# Patient Record
Sex: MALE | Race: OTHER
[De-identification: names, ages, dates, MRNs, and addresses within clinical notes are randomized per-mention and may not be internally consistent; named-entity substitution may affect disease eponyms.]

---

## 2018-02-04 ENCOUNTER — HOSPITAL ENCOUNTER (EMERGENCY)
Dept: HOSPITAL 12 - ER | Age: 83
Discharge: HOME | End: 2018-02-04
Payer: MEDICARE

## 2018-02-04 VITALS — SYSTOLIC BLOOD PRESSURE: 124 MMHG | DIASTOLIC BLOOD PRESSURE: 67 MMHG

## 2018-02-04 VITALS — HEIGHT: 65 IN | WEIGHT: 155 LBS | BODY MASS INDEX: 25.83 KG/M2

## 2018-02-04 DIAGNOSIS — E03.9: ICD-10-CM

## 2018-02-04 DIAGNOSIS — I70.0: ICD-10-CM

## 2018-02-04 DIAGNOSIS — M10.9: Primary | ICD-10-CM

## 2018-02-04 DIAGNOSIS — J06.9: ICD-10-CM

## 2018-02-04 DIAGNOSIS — M19.90: ICD-10-CM

## 2018-02-04 LAB
ALP SERPL-CCNC: 75 U/L (ref 50–136)
ALT SERPL W/O P-5'-P-CCNC: 23 U/L (ref 16–63)
AST SERPL-CCNC: 22 U/L (ref 15–37)
BASOPHILS # BLD AUTO: 0 K/UL (ref 0–8)
BASOPHILS NFR BLD AUTO: 0.6 % (ref 0–2)
BILIRUB DIRECT SERPL-MCNC: 0.1 MG/DL (ref 0–0.2)
BILIRUB SERPL-MCNC: 0.5 MG/DL (ref 0.2–1)
BUN SERPL-MCNC: 25 MG/DL (ref 7–18)
CHLORIDE SERPL-SCNC: 104 MMOL/L (ref 98–107)
CO2 SERPL-SCNC: 22 MMOL/L (ref 21–32)
CREAT SERPL-MCNC: 2.1 MG/DL (ref 0.6–1.3)
EOSINOPHIL # BLD AUTO: 0.2 K/UL (ref 0–0.7)
EOSINOPHIL NFR BLD AUTO: 3.4 % (ref 0–7)
GLUCOSE SERPL-MCNC: 115 MG/DL (ref 74–106)
HCT VFR BLD AUTO: 34.2 % (ref 36.7–47.1)
HGB BLD-MCNC: 11.4 G/DL (ref 12.5–16.3)
LYMPHOCYTES # BLD AUTO: 1.5 K/UL (ref 20–40)
LYMPHOCYTES NFR BLD AUTO: 20.6 % (ref 20.5–51.5)
MCH RBC QN AUTO: 32.7 UUG (ref 23.8–33.4)
MCHC RBC AUTO-ENTMCNC: 34 G/DL (ref 32.5–36.3)
MCV RBC AUTO: 97.5 FL (ref 73–96.2)
MONOCYTES # BLD AUTO: 0.6 K/UL (ref 2–10)
MONOCYTES NFR BLD AUTO: 8.6 % (ref 0–11)
NEUTROPHILS # BLD AUTO: 4.8 K/UL (ref 1.8–8.9)
NEUTROPHILS NFR BLD AUTO: 66.8 % (ref 38.5–71.5)
PLATELET # BLD AUTO: 166 K/UL (ref 152–348)
POTASSIUM SERPL-SCNC: 4.2 MMOL/L (ref 3.5–5.1)
RBC # BLD AUTO: 3.5 MIL/UL (ref 4.06–5.63)
WBC # BLD AUTO: 7.2 K/UL (ref 3.6–10.2)
WS STN SPEC: 7.6 G/DL (ref 6.4–8.2)

## 2018-02-04 PROCEDURE — A4663 DIALYSIS BLOOD PRESSURE CUFF: HCPCS

## 2023-06-06 ENCOUNTER — HOSPITAL ENCOUNTER (INPATIENT)
Dept: HOSPITAL 12 - ER | Age: 88
LOS: 1 days | Discharge: HOME | DRG: 683 | End: 2023-06-07
Attending: INTERNAL MEDICINE | Admitting: INTERNAL MEDICINE
Payer: COMMERCIAL

## 2023-06-06 VITALS — WEIGHT: 145 LBS | BODY MASS INDEX: 24.16 KG/M2 | HEIGHT: 65 IN

## 2023-06-06 VITALS — DIASTOLIC BLOOD PRESSURE: 57 MMHG | SYSTOLIC BLOOD PRESSURE: 127 MMHG

## 2023-06-06 VITALS — SYSTOLIC BLOOD PRESSURE: 146 MMHG | DIASTOLIC BLOOD PRESSURE: 76 MMHG

## 2023-06-06 VITALS — SYSTOLIC BLOOD PRESSURE: 138 MMHG | DIASTOLIC BLOOD PRESSURE: 53 MMHG

## 2023-06-06 DIAGNOSIS — G45.9: ICD-10-CM

## 2023-06-06 DIAGNOSIS — J30.2: ICD-10-CM

## 2023-06-06 DIAGNOSIS — N18.9: ICD-10-CM

## 2023-06-06 DIAGNOSIS — I10: ICD-10-CM

## 2023-06-06 DIAGNOSIS — D53.9: ICD-10-CM

## 2023-06-06 DIAGNOSIS — N25.0: ICD-10-CM

## 2023-06-06 DIAGNOSIS — H91.93: ICD-10-CM

## 2023-06-06 DIAGNOSIS — N17.0: Primary | ICD-10-CM

## 2023-06-06 DIAGNOSIS — E78.5: ICD-10-CM

## 2023-06-06 DIAGNOSIS — Z96.21: ICD-10-CM

## 2023-06-06 DIAGNOSIS — G93.89: ICD-10-CM

## 2023-06-06 DIAGNOSIS — I69.398: ICD-10-CM

## 2023-06-06 DIAGNOSIS — Z79.890: ICD-10-CM

## 2023-06-06 DIAGNOSIS — H53.121: ICD-10-CM

## 2023-06-06 DIAGNOSIS — I35.1: ICD-10-CM

## 2023-06-06 DIAGNOSIS — I13.10: ICD-10-CM

## 2023-06-06 DIAGNOSIS — Z20.822: ICD-10-CM

## 2023-06-06 DIAGNOSIS — E03.9: ICD-10-CM

## 2023-06-06 DIAGNOSIS — Z79.899: ICD-10-CM

## 2023-06-06 DIAGNOSIS — I35.8: ICD-10-CM

## 2023-06-06 LAB
ALP SERPL-CCNC: 131 U/L (ref 50–136)
ALT SERPL W/O P-5'-P-CCNC: 24 U/L (ref 16–63)
APPEARANCE UR: CLEAR
AST SERPL-CCNC: 14 U/L (ref 15–37)
BILIRUB DIRECT SERPL-MCNC: 0.1 MG/DL (ref 0–0.2)
BILIRUB SERPL-MCNC: 0.3 MG/DL (ref 0.2–1)
BILIRUB UR QL STRIP: NEGATIVE
BUN SERPL-MCNC: 43 MG/DL (ref 7–18)
CHLORIDE SERPL-SCNC: 106 MMOL/L (ref 98–107)
CO2 SERPL-SCNC: 23 MMOL/L (ref 21–32)
COLOR UR: YELLOW
CREAT SERPL-MCNC: 2.5 MG/DL (ref 0.6–1.3)
GLUCOSE SERPL-MCNC: 76 MG/DL (ref 74–106)
GLUCOSE UR STRIP-MCNC: NEGATIVE MG/DL
HCT VFR BLD AUTO: 30.6 % (ref 36.7–47.1)
HGB UR QL STRIP: NEGATIVE
KETONES UR STRIP-MCNC: NEGATIVE MG/DL
LEUKOCYTE ESTERASE UR QL STRIP: NEGATIVE
MCH RBC QN AUTO: 33.3 UUG (ref 23.8–33.4)
MCV RBC AUTO: 98.9 FL (ref 73–96.2)
NITRITE UR QL STRIP: NEGATIVE
PH UR STRIP: 5 [PH] (ref 5–8)
PLATELET # BLD AUTO: 201 K/UL (ref 152–348)
POTASSIUM SERPL-SCNC: 4.9 MMOL/L (ref 3.5–5.1)
RBC #/AREA URNS HPF: (no result) /HPF (ref 0–3)
SP GR UR STRIP: 1.02 (ref 1–1.03)
UROBILINOGEN UR STRIP-MCNC: 0.2 E.U./DL
WBC #/AREA URNS HPF: (no result) /HPF
WBC #/AREA URNS HPF: (no result) /HPF (ref 0–3)
WS STN SPEC: 7.3 G/DL (ref 6.4–8.2)

## 2023-06-06 PROCEDURE — G0378 HOSPITAL OBSERVATION PER HR: HCPCS

## 2023-06-06 PROCEDURE — A6209 FOAM DRSG <=16 SQ IN W/O BDR: HCPCS

## 2023-06-06 PROCEDURE — A4663 DIALYSIS BLOOD PRESSURE CUFF: HCPCS

## 2023-06-06 RX ADMIN — SODIUM CHLORIDE PRN MLS/HR: 0.45 INJECTION, SOLUTION INTRAVENOUS at 16:34

## 2023-06-06 NOTE — NUR
Received patient lying in bed AAOx4. In no apparent distress. IV intact and patent. Will 
continue plan of care

## 2023-06-06 NOTE — NUR
Received Pt's daughter's (Shea) phone number: [phone]. Safety 
measures in place. Will continue to monitor.

## 2023-06-06 NOTE — NUR
Pt has been admitted to  305 in stable condition. Pt arrived in  safely. 
All belongings are with Pt. Sissy is aware of Pt's arrival.

## 2023-06-06 NOTE — NUR
Received call from 3rd floor Charge Willis BOUDREAUX: pt will be going to Rm 305. 
Accepting Nurse is Sissy. Will call Sissy in approximately 15-20 min for 
report.

## 2023-06-06 NOTE — NUR
Pt seen by MD for bedside eval. UA sent to lab. Blood work sent to lab. Safety 
measures in place. Will continue to monitor.

## 2023-06-06 NOTE — NUR
87 YEAR OLD MALE RECEIVED TO ROOM 305  FOR HYPERTENSION AND RENAL INSUFFICIENCY.PT DENIES 
ANY PAIN SOB.CALL LIGHT WITH IN REACH ORIENT THE PT TO ROOM MD NOTIFIED FOR THE ADMISSION

## 2023-06-07 VITALS — SYSTOLIC BLOOD PRESSURE: 137 MMHG | DIASTOLIC BLOOD PRESSURE: 58 MMHG

## 2023-06-07 VITALS — SYSTOLIC BLOOD PRESSURE: 125 MMHG | DIASTOLIC BLOOD PRESSURE: 52 MMHG

## 2023-06-07 VITALS — DIASTOLIC BLOOD PRESSURE: 75 MMHG | SYSTOLIC BLOOD PRESSURE: 124 MMHG

## 2023-06-07 VITALS — DIASTOLIC BLOOD PRESSURE: 58 MMHG | SYSTOLIC BLOOD PRESSURE: 126 MMHG

## 2023-06-07 LAB
ALP SERPL-CCNC: 119 U/L (ref 50–136)
ALT SERPL W/O P-5'-P-CCNC: 25 U/L (ref 16–63)
AST SERPL-CCNC: 23 U/L (ref 15–37)
BILIRUB SERPL-MCNC: 0.3 MG/DL (ref 0.2–1)
BUN SERPL-MCNC: 40 MG/DL (ref 7–18)
CHLORIDE SERPL-SCNC: 104 MMOL/L (ref 98–107)
CHOLEST SERPL-MCNC: 169 MG/DL (ref ?–200)
CO2 SERPL-SCNC: 24 MMOL/L (ref 21–32)
CREAT SERPL-MCNC: 2.3 MG/DL (ref 0.6–1.3)
GLUCOSE SERPL-MCNC: 90 MG/DL (ref 74–106)
HCT VFR BLD AUTO: 28.2 % (ref 36.7–47.1)
HDLC SERPL-MCNC: 48 MG/DL (ref 40–60)
IRON SERPL-MCNC: 75 UG/DL (ref 50–175)
MAGNESIUM SERPL-MCNC: 2.1 MG/DL (ref 1.8–2.4)
MCH RBC QN AUTO: 33.8 UUG (ref 23.8–33.4)
MCV RBC AUTO: 98.9 FL (ref 73–96.2)
PHOSPHATE SERPL-MCNC: 3.8 MG/DL (ref 2.5–4.9)
PLATELET # BLD AUTO: 181 K/UL (ref 152–348)
POTASSIUM SERPL-SCNC: 4.9 MMOL/L (ref 3.5–5.1)
TRIGL SERPL-MCNC: 158 MG/DL (ref 30–150)
TSH SERPL DL<=0.005 MIU/L-ACNC: 7.08 MIU/ML (ref 0.36–3.74)
WS STN SPEC: 6.9 G/DL (ref 6.4–8.2)

## 2023-06-07 RX ADMIN — SODIUM CHLORIDE PRN MLS/HR: 0.45 INJECTION, SOLUTION INTRAVENOUS at 10:57

## 2023-06-07 NOTE — NUR
pt is discharge. pt will go home with family. pt alert and oriented. family at bedside. 
ambulatory with steady gait. denies dizziness or pain. vitals wnl. exit care provided. iv 
access removed. educated family regarding new medication of the pt.  wheelchair was offered 
but pt refused family aware. pt will walk going to the car.  pt daughter erwin will drive 
pt home.

## 2023-06-07 NOTE — NUR
Patient expressed concerns about reasoning of hospitalization. Had difficulty sleeping. 
Needs attended to and met. Continue plan of care

## 2023-07-09 ENCOUNTER — HOSPITAL ENCOUNTER (INPATIENT)
Dept: HOSPITAL 12 - ER | Age: 88
LOS: 4 days | Discharge: HOME | DRG: 683 | End: 2023-07-13
Payer: COMMERCIAL

## 2023-07-09 VITALS — WEIGHT: 142.02 LBS | HEIGHT: 63 IN | BODY MASS INDEX: 25.16 KG/M2

## 2023-07-09 VITALS — TEMPERATURE: 98.2 F | OXYGEN SATURATION: 98 % | SYSTOLIC BLOOD PRESSURE: 107 MMHG | DIASTOLIC BLOOD PRESSURE: 50 MMHG

## 2023-07-09 DIAGNOSIS — E03.9: ICD-10-CM

## 2023-07-09 DIAGNOSIS — I12.9: ICD-10-CM

## 2023-07-09 DIAGNOSIS — N18.4: ICD-10-CM

## 2023-07-09 DIAGNOSIS — H91.90: ICD-10-CM

## 2023-07-09 DIAGNOSIS — D63.1: ICD-10-CM

## 2023-07-09 DIAGNOSIS — I95.9: ICD-10-CM

## 2023-07-09 DIAGNOSIS — E44.1: ICD-10-CM

## 2023-07-09 DIAGNOSIS — N17.0: Primary | ICD-10-CM

## 2023-07-09 DIAGNOSIS — Z79.82: ICD-10-CM

## 2023-07-09 DIAGNOSIS — E87.5: ICD-10-CM

## 2023-07-09 DIAGNOSIS — E88.09: ICD-10-CM

## 2023-07-09 DIAGNOSIS — M19.90: ICD-10-CM

## 2023-07-09 DIAGNOSIS — D53.9: ICD-10-CM

## 2023-07-09 LAB
ALP SERPL-CCNC: 111 U/L (ref 50–136)
ALT SERPL W/O P-5'-P-CCNC: 20 U/L (ref 16–63)
APPEARANCE UR: CLEAR
AST SERPL-CCNC: 11 U/L (ref 15–37)
BILIRUB DIRECT SERPL-MCNC: 0.1 MG/DL (ref 0–0.2)
BILIRUB SERPL-MCNC: 0.4 MG/DL (ref 0.2–1)
BILIRUB UR QL STRIP: NEGATIVE
BUN SERPL-MCNC: 65 MG/DL (ref 7–18)
CHLORIDE SERPL-SCNC: 103 MMOL/L (ref 98–107)
CO2 SERPL-SCNC: 21 MMOL/L (ref 21–32)
COLOR UR: YELLOW
CREAT SERPL-MCNC: 3.3 MG/DL (ref 0.6–1.3)
DEPRECATED SQUAMOUS URNS QL MICRO: (no result) /HPF
GLUCOSE UR STRIP-MCNC: NEGATIVE MG/DL
HCT VFR BLD AUTO: 28 % (ref 36.7–47.1)
HGB UR QL STRIP: (no result)
KETONES UR STRIP-MCNC: NEGATIVE MG/DL
LEUKOCYTE ESTERASE UR QL STRIP: NEGATIVE
MCH RBC QN AUTO: 33.9 UUG (ref 23.8–33.4)
MCV RBC AUTO: 100 FL (ref 73–96.2)
NITRITE UR QL STRIP: NEGATIVE
PH UR STRIP: 5.5 [PH] (ref 5–8)
PLATELET # BLD AUTO: 200 K/UL (ref 152–348)
POTASSIUM SERPL-SCNC: 5.2 MMOL/L (ref 3.5–5.1)
RBC #/AREA URNS HPF: (no result) /HPF (ref 0–3)
SP GR UR STRIP: 1.01 (ref 1–1.03)
UROBILINOGEN UR STRIP-MCNC: 0.2 E.U./DL
WBC #/AREA URNS HPF: (no result) /HPF
WBC #/AREA URNS HPF: (no result) /HPF (ref 0–3)
WS STN SPEC: 7.6 G/DL (ref 6.4–8.2)

## 2023-07-09 PROCEDURE — A4663 DIALYSIS BLOOD PRESSURE CUFF: HCPCS

## 2023-07-09 PROCEDURE — G0378 HOSPITAL OBSERVATION PER HR: HCPCS

## 2023-07-09 PROCEDURE — C9113 INJ PANTOPRAZOLE SODIUM, VIA: HCPCS

## 2023-07-09 RX ADMIN — SODIUM CHLORIDE PRN MLS/HR: 0.9 INJECTION, SOLUTION INTRAVENOUS at 22:26

## 2023-07-09 RX ADMIN — DOCUSATE SODIUM SCH MG: 100 CAPSULE, LIQUID FILLED ORAL at 22:14

## 2023-07-09 RX ADMIN — HEPARIN SODIUM SCH UNITS: 5000 INJECTION, SOLUTION INTRAVENOUS; SUBCUTANEOUS at 22:16

## 2023-07-09 NOTE — NUR
Pt care continue as report is given to the 3RD FLOOR Nurse Tita as pt is been 
admitted to 3RD Floor Room 307 under the care off DR. Pinto.

## 2023-07-09 NOTE — NUR
The patient, SALBADOR MUÑIZ, 86 y/o, M admitted by ISABELA WILLIS NP, was given 
written information regarding hospital policies, unit procedures and contact persons.  



Valuables were checked and [].BIB BED A/A/O XS4, NO DISTRESS NOTED

## 2023-07-09 NOTE — NUR
Pt is noted in bed responsive as report is received from the off going nurse 
that Pt was brought in by family C/O hypotension with BP in the 88/54 and 
weakness x3days. Pt care contiune as he is been admitted to MED/SUR[facility] Room 307, 
report will be given shortly.

## 2023-07-10 VITALS — TEMPERATURE: 97.8 F | OXYGEN SATURATION: 100 % | DIASTOLIC BLOOD PRESSURE: 53 MMHG | SYSTOLIC BLOOD PRESSURE: 113 MMHG

## 2023-07-10 VITALS — TEMPERATURE: 97.9 F | DIASTOLIC BLOOD PRESSURE: 52 MMHG | SYSTOLIC BLOOD PRESSURE: 138 MMHG | OXYGEN SATURATION: 97 %

## 2023-07-10 VITALS — OXYGEN SATURATION: 98 % | TEMPERATURE: 97.7 F | SYSTOLIC BLOOD PRESSURE: 143 MMHG | DIASTOLIC BLOOD PRESSURE: 63 MMHG

## 2023-07-10 VITALS — SYSTOLIC BLOOD PRESSURE: 155 MMHG | DIASTOLIC BLOOD PRESSURE: 69 MMHG | TEMPERATURE: 98 F

## 2023-07-10 LAB
APPEARANCE UR: CLEAR
BILIRUB UR QL STRIP: NEGATIVE
BUN SERPL-MCNC: 58 MG/DL (ref 7–18)
CHLORIDE SERPL-SCNC: 109 MMOL/L (ref 98–107)
CO2 SERPL-SCNC: 21 MMOL/L (ref 21–32)
COLOR UR: YELLOW
CREAT SERPL-MCNC: 2.8 MG/DL (ref 0.6–1.3)
CREAT UR-MCNC: 46 MG/DL (ref 30–125)
GLUCOSE UR STRIP-MCNC: NEGATIVE MG/DL
HCT VFR BLD AUTO: 25.2 % (ref 36.7–47.1)
HGB UR QL STRIP: NEGATIVE
KETONES UR STRIP-MCNC: NEGATIVE MG/DL
LEUKOCYTE ESTERASE UR QL STRIP: NEGATIVE
MAGNESIUM SERPL-MCNC: 2 MG/DL (ref 1.8–2.4)
MCH RBC QN AUTO: 34.1 UUG (ref 23.8–33.4)
MCV RBC AUTO: 100 FL (ref 73–96.2)
NITRITE UR QL STRIP: NEGATIVE
PH UR STRIP: 5.5 [PH] (ref 5–8)
PHOSPHATE SERPL-MCNC: 5.3 MG/DL (ref 2.5–4.9)
PLATELET # BLD AUTO: 171 K/UL (ref 152–348)
POTASSIUM SERPL-SCNC: 5 MMOL/L (ref 3.5–5.1)
PROT UR-MCNC: 24.4 MG/DL
SP GR UR STRIP: 1.01 (ref 1–1.03)
UROBILINOGEN UR STRIP-MCNC: 0.2 E.U./DL

## 2023-07-10 RX ADMIN — HEPARIN SODIUM SCH UNITS: 5000 INJECTION, SOLUTION INTRAVENOUS; SUBCUTANEOUS at 08:25

## 2023-07-10 RX ADMIN — Medication SCH TAB: at 08:24

## 2023-07-10 RX ADMIN — HEPARIN SODIUM SCH UNITS: 5000 INJECTION, SOLUTION INTRAVENOUS; SUBCUTANEOUS at 20:59

## 2023-07-10 RX ADMIN — ASPIRIN SCH MG: 81 TABLET, COATED ORAL at 08:23

## 2023-07-10 RX ADMIN — ATORVASTATIN CALCIUM SCH MG: 40 TABLET, FILM COATED ORAL at 20:58

## 2023-07-10 RX ADMIN — DEXTROSE SCH MG: 50 INJECTION, SOLUTION INTRAVENOUS at 08:24

## 2023-07-10 RX ADMIN — DOCUSATE SODIUM SCH MG: 100 CAPSULE, LIQUID FILLED ORAL at 20:59

## 2023-07-10 RX ADMIN — SODIUM CHLORIDE PRN MLS/HR: 0.9 INJECTION, SOLUTION INTRAVENOUS at 12:10

## 2023-07-10 NOTE — NUR
Detail Level: Detailed PATIENT SEEN AND EXAMINED BY DR BATES WITH NEW ORDERS AND NOTED. Detail Level: Simple

## 2023-07-10 NOTE — NUR
PATIENT AND HER SON AND WIFE AT THE BEDSIDE AT THIS TIME HE IS AWARE OF NEED FOR URINE 
SPECIMEN STILL AWAITING FOR SPECIMEN PATIENT HAS GONE TO THE BATHROOM BY HIM SELF AND FORGOT 
TO COLLECT THE SPECIMEN WILL CONTINUE TO OBSERVE.

## 2023-07-10 NOTE — NUR
RECEIVED PATIENT AWAKE ALERT AND ABLE TO MAKE SIMPLE NEEDS KNOWN DENIES DISCOMFORTS REMAIN 
ON IVF AS ORDERED WITH NO S/S OF INFILTERATION ON SITE CALL LIGHTS AND PERSONAL BELONGINGS 
ARE WITHIN EASY REACH ASSISTED TO THE BATHROOM HOLDING ONTO THE IV POLE WITH SLOW STEADY 
GAIT WILL CONTINUE TO OBSERVE.

## 2023-07-11 VITALS — SYSTOLIC BLOOD PRESSURE: 127 MMHG | OXYGEN SATURATION: 99 % | DIASTOLIC BLOOD PRESSURE: 50 MMHG | TEMPERATURE: 98 F

## 2023-07-11 VITALS — OXYGEN SATURATION: 98 % | TEMPERATURE: 98.4 F | SYSTOLIC BLOOD PRESSURE: 149 MMHG | DIASTOLIC BLOOD PRESSURE: 64 MMHG

## 2023-07-11 VITALS — TEMPERATURE: 97.6 F | OXYGEN SATURATION: 98 % | DIASTOLIC BLOOD PRESSURE: 70 MMHG | SYSTOLIC BLOOD PRESSURE: 159 MMHG

## 2023-07-11 VITALS — OXYGEN SATURATION: 99 % | SYSTOLIC BLOOD PRESSURE: 125 MMHG | DIASTOLIC BLOOD PRESSURE: 86 MMHG | TEMPERATURE: 98.3 F

## 2023-07-11 LAB
ALP SERPL-CCNC: 90 U/L (ref 50–136)
ALT SERPL W/O P-5'-P-CCNC: 21 U/L (ref 16–63)
AST SERPL-CCNC: 16 U/L (ref 15–37)
BILIRUB SERPL-MCNC: 0.2 MG/DL (ref 0.2–1)
BUN SERPL-MCNC: 46 MG/DL (ref 7–18)
CHLORIDE SERPL-SCNC: 111 MMOL/L (ref 98–107)
CK SERPL-CCNC: 114 U/L (ref 39–308)
CO2 SERPL-SCNC: 22 MMOL/L (ref 21–32)
CREAT SERPL-MCNC: 2.3 MG/DL (ref 0.6–1.3)
HCT VFR BLD AUTO: 24.5 % (ref 36.7–47.1)
MAGNESIUM SERPL-MCNC: 1.8 MG/DL (ref 1.8–2.4)
MCH RBC QN AUTO: 33.8 UUG (ref 23.8–33.4)
MCV RBC AUTO: 99.7 FL (ref 73–96.2)
PHOSPHATE SERPL-MCNC: 4 MG/DL (ref 2.5–4.9)
PLATELET # BLD AUTO: 166 K/UL (ref 152–348)
POTASSIUM SERPL-SCNC: 4.9 MMOL/L (ref 3.5–5.1)
WS STN SPEC: 6.5 G/DL (ref 6.4–8.2)

## 2023-07-11 RX ADMIN — Medication SCH TAB: at 08:39

## 2023-07-11 RX ADMIN — HEPARIN SODIUM SCH UNITS: 5000 INJECTION, SOLUTION INTRAVENOUS; SUBCUTANEOUS at 20:50

## 2023-07-11 RX ADMIN — ASPIRIN SCH MG: 81 TABLET, COATED ORAL at 08:39

## 2023-07-11 RX ADMIN — SODIUM CHLORIDE PRN MLS/HR: 0.9 INJECTION, SOLUTION INTRAVENOUS at 01:36

## 2023-07-11 RX ADMIN — SODIUM CHLORIDE PRN MLS/HR: 0.9 INJECTION, SOLUTION INTRAVENOUS at 14:24

## 2023-07-11 RX ADMIN — ATORVASTATIN CALCIUM SCH MG: 40 TABLET, FILM COATED ORAL at 20:45

## 2023-07-11 RX ADMIN — DOCUSATE SODIUM SCH MG: 100 CAPSULE, LIQUID FILLED ORAL at 20:45

## 2023-07-11 RX ADMIN — DEXTROSE SCH MG: 50 INJECTION, SOLUTION INTRAVENOUS at 08:39

## 2023-07-11 RX ADMIN — LEVOTHYROXINE SODIUM SCH MCG: 100 TABLET ORAL at 06:23

## 2023-07-11 RX ADMIN — HEPARIN SODIUM SCH UNITS: 5000 INJECTION, SOLUTION INTRAVENOUS; SUBCUTANEOUS at 08:38

## 2023-07-11 NOTE — NUR
RESTING IN BED AWAKE ALERT AND COOPERATIVE HE IS FORGETFUL AT THIS TIME ABLE TO MAKE SIMPLE 
NEEDS KNOWN REMAIN ON IVF AS ORDERED WITH NO S/S OF INFILTERATION AT THIS TIME CALL LIGHTS 
AND PERSOANL BELONGINGS ARE WITHIN EASY REACH WILL CONTINUE TO OBSERVE.

## 2023-07-11 NOTE — NUR
Patient awake, speak Uz[place] and hard of hearing, but able to understand little English, 
staff speak U[place] able to communicated patient needs, no complain of pain at this time, 
assist with toileting, cont to monitor.

## 2023-07-11 NOTE — NUR
RESTING COMFORTABLY WITH IVF IN PROGRESS AS ORDERED DENIES DISCOMFORTS FAMILY AT BEDSIDE AT 
THIS TIME WILL CONTINUE TO OBSERVE.

## 2023-07-12 VITALS — SYSTOLIC BLOOD PRESSURE: 165 MMHG | OXYGEN SATURATION: 100 % | TEMPERATURE: 98.5 F | DIASTOLIC BLOOD PRESSURE: 67 MMHG

## 2023-07-12 VITALS — OXYGEN SATURATION: 100 % | DIASTOLIC BLOOD PRESSURE: 51 MMHG | SYSTOLIC BLOOD PRESSURE: 144 MMHG | TEMPERATURE: 97.8 F

## 2023-07-12 VITALS — TEMPERATURE: 98.7 F | OXYGEN SATURATION: 100 % | SYSTOLIC BLOOD PRESSURE: 151 MMHG | DIASTOLIC BLOOD PRESSURE: 66 MMHG

## 2023-07-12 VITALS — SYSTOLIC BLOOD PRESSURE: 124 MMHG | OXYGEN SATURATION: 98 % | TEMPERATURE: 97.8 F | DIASTOLIC BLOOD PRESSURE: 55 MMHG

## 2023-07-12 VITALS — OXYGEN SATURATION: 100 % | TEMPERATURE: 98.7 F | DIASTOLIC BLOOD PRESSURE: 68 MMHG | SYSTOLIC BLOOD PRESSURE: 148 MMHG

## 2023-07-12 VITALS — TEMPERATURE: 97.6 F | DIASTOLIC BLOOD PRESSURE: 61 MMHG | SYSTOLIC BLOOD PRESSURE: 160 MMHG | OXYGEN SATURATION: 100 %

## 2023-07-12 VITALS — SYSTOLIC BLOOD PRESSURE: 148 MMHG | TEMPERATURE: 97.6 F | OXYGEN SATURATION: 99 % | DIASTOLIC BLOOD PRESSURE: 50 MMHG

## 2023-07-12 LAB
BUN SERPL-MCNC: 40 MG/DL (ref 7–18)
CHLORIDE SERPL-SCNC: 111 MMOL/L (ref 98–107)
CO2 SERPL-SCNC: 22 MMOL/L (ref 21–32)
CREAT SERPL-MCNC: 2.2 MG/DL (ref 0.6–1.3)
FERRITIN SERPL-MCNC: 187 NG/ML (ref 26–388)
HCT VFR BLD AUTO: 23.5 % (ref 36.7–47.1)
HEMOCCULT STL QL: NEGATIVE
IRON SERPL-MCNC: 64 UG/DL (ref 50–175)
MAGNESIUM SERPL-MCNC: 1.9 MG/DL (ref 1.8–2.4)
MCH RBC QN AUTO: 34.2 UUG (ref 23.8–33.4)
MCV RBC AUTO: 100.4 FL (ref 73–96.2)
PHOSPHATE SERPL-MCNC: 3.7 MG/DL (ref 2.5–4.9)
PLATELET # BLD AUTO: 160 K/UL (ref 152–348)
POTASSIUM SERPL-SCNC: 5.1 MMOL/L (ref 3.5–5.1)

## 2023-07-12 RX ADMIN — DEXTROSE SCH MG: 50 INJECTION, SOLUTION INTRAVENOUS at 08:20

## 2023-07-12 RX ADMIN — ASPIRIN SCH MG: 81 TABLET, COATED ORAL at 08:20

## 2023-07-12 RX ADMIN — SODIUM CHLORIDE PRN MLS/HR: 0.9 INJECTION, SOLUTION INTRAVENOUS at 00:37

## 2023-07-12 RX ADMIN — HEPARIN SODIUM SCH UNITS: 5000 INJECTION, SOLUTION INTRAVENOUS; SUBCUTANEOUS at 20:53

## 2023-07-12 RX ADMIN — LEVOTHYROXINE SODIUM SCH MCG: 100 TABLET ORAL at 06:07

## 2023-07-12 RX ADMIN — HEPARIN SODIUM SCH UNITS: 5000 INJECTION, SOLUTION INTRAVENOUS; SUBCUTANEOUS at 08:24

## 2023-07-12 RX ADMIN — DOCUSATE SODIUM SCH MG: 100 CAPSULE, LIQUID FILLED ORAL at 20:51

## 2023-07-12 RX ADMIN — ATORVASTATIN CALCIUM SCH MG: 40 TABLET, FILM COATED ORAL at 20:51

## 2023-07-12 RX ADMIN — Medication SCH TAB: at 08:20

## 2023-07-12 RX ADMIN — LOSARTAN POTASSIUM SCH MG: 50 TABLET, FILM COATED ORAL at 17:50

## 2023-07-12 NOTE — NUR
Received patient awake and alert, standing on the bedside, ambulating. In no acute distress, 
no SOB

Seen and examined by Amberly Cramer NP with orders made and carried out

Vital signs taken and recorded

Due medications given

Stool specimen submitted to laboratory

Observed accordingly, needs attended

## 2023-07-12 NOTE — NUR
Received patient awake , ambulate to toilet with steady gait, no sob no chest pain, Patient 
alert oriented, no confusion noted, voiding well, no complain of pain, cont to monitor.

## 2023-07-12 NOTE — NUR
Patient awake, no complain of pain at this time, ambulate to toilet for bladder 
eliminations, cont to monitor.

## 2023-07-13 VITALS — TEMPERATURE: 98.3 F | OXYGEN SATURATION: 98 % | DIASTOLIC BLOOD PRESSURE: 61 MMHG | SYSTOLIC BLOOD PRESSURE: 148 MMHG

## 2023-07-13 VITALS — OXYGEN SATURATION: 100 % | SYSTOLIC BLOOD PRESSURE: 145 MMHG | TEMPERATURE: 98.1 F | DIASTOLIC BLOOD PRESSURE: 63 MMHG

## 2023-07-13 VITALS — OXYGEN SATURATION: 100 % | SYSTOLIC BLOOD PRESSURE: 164 MMHG | DIASTOLIC BLOOD PRESSURE: 70 MMHG | TEMPERATURE: 98.1 F

## 2023-07-13 VITALS — SYSTOLIC BLOOD PRESSURE: 143 MMHG | OXYGEN SATURATION: 98 % | TEMPERATURE: 97.8 F | DIASTOLIC BLOOD PRESSURE: 57 MMHG

## 2023-07-13 LAB
ALBUMIN SERPL ELPH-MCNC: 3.3 G/DL (ref 2.9–4.4)
ALBUMIN/GLOB SERPL: 1.3 {RATIO} (ref 0.7–1.7)
ALP SERPL-CCNC: 94 U/L (ref 50–136)
ALPHA1 GLOB SERPL ELPH-MCNC: 0.2 G/DL (ref 0–0.4)
ALPHA2 GLOB SERPL ELPH-MCNC: 0.6 G/DL (ref 0.4–1)
ALT SERPL W/O P-5'-P-CCNC: 23 U/L (ref 16–63)
AST SERPL-CCNC: 11 U/L (ref 15–37)
B-GLOBULIN SERPL ELPH-MCNC: 0.9 G/DL (ref 0.7–1.3)
BILIRUB SERPL-MCNC: 0.4 MG/DL (ref 0.2–1)
BUN SERPL-MCNC: 35 MG/DL (ref 7–18)
CHLORIDE SERPL-SCNC: 110 MMOL/L (ref 98–107)
CO2 SERPL-SCNC: 23 MMOL/L (ref 21–32)
CREAT SERPL-MCNC: 2.2 MG/DL (ref 0.6–1.3)
GAMMA GLOB SERPL ELPH-MCNC: 1 G/DL (ref 0.4–1.8)
GLOBULIN SER CALC-MCNC: 2.6 G/DL (ref 2.2–3.9)
HCT VFR BLD AUTO: 25.4 % (ref 36.7–47.1)
MAGNESIUM SERPL-MCNC: 1.9 MG/DL (ref 1.8–2.4)
MCH RBC QN AUTO: 34.1 UUG (ref 23.8–33.4)
MCV RBC AUTO: 100.4 FL (ref 73–96.2)
PHOSPHATE SERPL-MCNC: 3.7 MG/DL (ref 2.5–4.9)
PLATELET # BLD AUTO: 181 K/UL (ref 152–348)
POTASSIUM SERPL-SCNC: 4.6 MMOL/L (ref 3.5–5.1)
WS STN SPEC: 6.8 G/DL (ref 6.4–8.2)

## 2023-07-13 RX ADMIN — HEPARIN SODIUM SCH UNITS: 5000 INJECTION, SOLUTION INTRAVENOUS; SUBCUTANEOUS at 08:40

## 2023-07-13 RX ADMIN — Medication SCH TAB: at 08:37

## 2023-07-13 RX ADMIN — LEVOTHYROXINE SODIUM SCH MCG: 100 TABLET ORAL at 06:13

## 2023-07-13 RX ADMIN — ASPIRIN SCH MG: 81 TABLET, COATED ORAL at 08:38

## 2023-07-13 RX ADMIN — LOSARTAN POTASSIUM SCH MG: 50 TABLET, FILM COATED ORAL at 08:38

## 2023-07-13 NOTE — NUR
Received patient lying in bed awake, alert and oriented. No acute signs of distress, no SOB

IV site at left fore arm g.20. Vital signs taken and recorded. Due medications given and 
recorded

Observed accordingly, needs attended

## 2023-07-13 NOTE — NUR
Seen and examined by Amberly Cramer NP with order for discharge

Prepared all papers for discharge, papers signed for discharge

Removed IV catheter, clean and intact

Vital signs taken and recorded

All questions answered, attended

## 2023-07-13 NOTE — NUR
Assisted patient for discharge together with the wife and daughter ambulatory

Stable at this time no complain

Discharge at 1430

## 2023-07-13 NOTE — NUR
Patient awake ambulate to toilet with steady gain, min assist, /57, no complain of 
pain, no sob no chest pain, cont to monitor.

## 2023-07-14 LAB
ALBUMIN SERPL ELPH-MCNC: 3.3 G/DL (ref 2.9–4.4)
ALBUMIN/GLOB SERPL: 1 {RATIO} (ref 0.7–1.7)
ALPHA1 GLOB SERPL ELPH-MCNC: 0.3 G/DL (ref 0–0.4)
ALPHA2 GLOB SERPL ELPH-MCNC: 0.8 G/DL (ref 0.4–1)
B-GLOBULIN SERPL ELPH-MCNC: 1 G/DL (ref 0.7–1.3)
GAMMA GLOB SERPL ELPH-MCNC: 1.3 G/DL (ref 0.4–1.8)
GLOBULIN SER CALC-MCNC: 3.3 G/DL (ref 2.2–3.9)